# Patient Record
Sex: MALE | Race: WHITE | NOT HISPANIC OR LATINO | URBAN - METROPOLITAN AREA
[De-identification: names, ages, dates, MRNs, and addresses within clinical notes are randomized per-mention and may not be internally consistent; named-entity substitution may affect disease eponyms.]

---

## 2017-02-04 ENCOUNTER — COMMUNICATION - HEALTHEAST (OUTPATIENT)
Dept: FAMILY MEDICINE | Facility: CLINIC | Age: 72
End: 2017-02-04

## 2017-02-04 DIAGNOSIS — E78.5 HYPERLIPIDEMIA: ICD-10-CM

## 2017-02-13 ENCOUNTER — COMMUNICATION - HEALTHEAST (OUTPATIENT)
Dept: FAMILY MEDICINE | Facility: CLINIC | Age: 72
End: 2017-02-13

## 2017-02-13 DIAGNOSIS — I10 HYPERTENSION: ICD-10-CM

## 2017-02-15 RX ORDER — LOSARTAN POTASSIUM 50 MG/1
50 TABLET ORAL DAILY
Qty: 90 TABLET | Refills: 0 | Status: SHIPPED | OUTPATIENT
Start: 2017-02-15

## 2017-02-20 ENCOUNTER — AMBULATORY - HEALTHEAST (OUTPATIENT)
Dept: LAB | Facility: CLINIC | Age: 72
End: 2017-02-20

## 2017-02-20 DIAGNOSIS — E78.5 HYPERLIPIDEMIA: ICD-10-CM

## 2017-02-20 DIAGNOSIS — M19.90 INFLAMMATORY ARTHRITIS: ICD-10-CM

## 2017-02-20 LAB
ALT SERPL W P-5'-P-CCNC: 39 U/L (ref 0–45)
AST SERPL W P-5'-P-CCNC: 22 U/L (ref 0–40)
CHOLEST SERPL-MCNC: 154 MG/DL
CREAT SERPL-MCNC: 0.73 MG/DL (ref 0.7–1.3)
FASTING STATUS PATIENT QL REPORTED: YES
GFR SERPL CREATININE-BSD FRML MDRD: >60 ML/MIN/1.73M2
HDLC SERPL-MCNC: 41 MG/DL
LDLC SERPL CALC-MCNC: 93 MG/DL
TRIGL SERPL-MCNC: 100 MG/DL

## 2017-02-22 ENCOUNTER — OFFICE VISIT - HEALTHEAST (OUTPATIENT)
Dept: RHEUMATOLOGY | Facility: CLINIC | Age: 72
End: 2017-02-22

## 2017-02-22 ENCOUNTER — COMMUNICATION - HEALTHEAST (OUTPATIENT)
Dept: RHEUMATOLOGY | Facility: CLINIC | Age: 72
End: 2017-02-22

## 2017-02-22 DIAGNOSIS — M19.90 INFLAMMATORY ARTHRITIS: ICD-10-CM

## 2017-02-22 DIAGNOSIS — Z79.899 HIGH RISK MEDICATION USE: ICD-10-CM

## 2017-02-22 DIAGNOSIS — M19.041 PRIMARY OSTEOARTHRITIS OF BOTH HANDS: ICD-10-CM

## 2017-02-22 DIAGNOSIS — M19.042 PRIMARY OSTEOARTHRITIS OF BOTH HANDS: ICD-10-CM

## 2017-02-22 ASSESSMENT — MIFFLIN-ST. JEOR: SCORE: 1671.91

## 2017-04-21 ENCOUNTER — AMBULATORY - HEALTHEAST (OUTPATIENT)
Dept: LAB | Facility: CLINIC | Age: 72
End: 2017-04-21

## 2017-04-21 DIAGNOSIS — M19.90 INFLAMMATORY ARTHRITIS: ICD-10-CM

## 2017-04-21 LAB
ALT SERPL W P-5'-P-CCNC: 26 U/L (ref 0–45)
CREAT SERPL-MCNC: 0.82 MG/DL (ref 0.7–1.3)
GFR SERPL CREATININE-BSD FRML MDRD: >60 ML/MIN/1.73M2

## 2017-06-14 ENCOUNTER — AMBULATORY - HEALTHEAST (OUTPATIENT)
Dept: LAB | Facility: CLINIC | Age: 72
End: 2017-06-14

## 2017-06-14 DIAGNOSIS — M19.90 INFLAMMATORY ARTHRITIS: ICD-10-CM

## 2017-06-14 LAB
ALT SERPL W P-5'-P-CCNC: 38 U/L (ref 0–45)
CREAT SERPL-MCNC: 0.77 MG/DL (ref 0.7–1.3)
GFR SERPL CREATININE-BSD FRML MDRD: >60 ML/MIN/1.73M2

## 2017-07-03 ENCOUNTER — OFFICE VISIT - HEALTHEAST (OUTPATIENT)
Dept: RHEUMATOLOGY | Facility: CLINIC | Age: 72
End: 2017-07-03

## 2017-07-03 DIAGNOSIS — I10 HYPERTENSION: ICD-10-CM

## 2017-07-03 DIAGNOSIS — M19.042 PRIMARY OSTEOARTHRITIS OF BOTH HANDS: ICD-10-CM

## 2017-07-03 DIAGNOSIS — M19.041 PRIMARY OSTEOARTHRITIS OF BOTH HANDS: ICD-10-CM

## 2017-07-03 DIAGNOSIS — M19.90 INFLAMMATORY ARTHRITIS: ICD-10-CM

## 2017-07-03 DIAGNOSIS — Z79.899 HIGH RISK MEDICATION USE: ICD-10-CM

## 2017-07-10 ENCOUNTER — COMMUNICATION - HEALTHEAST (OUTPATIENT)
Dept: RHEUMATOLOGY | Facility: CLINIC | Age: 72
End: 2017-07-10

## 2017-07-10 DIAGNOSIS — M19.90 INFLAMMATORY ARTHRITIS: ICD-10-CM

## 2017-09-13 ENCOUNTER — COMMUNICATION - HEALTHEAST (OUTPATIENT)
Dept: LAB | Facility: CLINIC | Age: 72
End: 2017-09-13

## 2017-09-13 DIAGNOSIS — I10 HYPERTENSION: ICD-10-CM

## 2017-09-13 DIAGNOSIS — M19.90 OSTEOARTHRITIS: ICD-10-CM

## 2017-09-13 DIAGNOSIS — M19.90 INFLAMMATORY ARTHRITIS: ICD-10-CM

## 2021-05-30 VITALS — HEIGHT: 69 IN | BODY MASS INDEX: 30.78 KG/M2 | WEIGHT: 207.8 LBS

## 2021-05-31 VITALS — WEIGHT: 211.2 LBS | BODY MASS INDEX: 30.96 KG/M2

## 2021-06-09 NOTE — PROGRESS NOTES
ASSESSMENT AND PLAN:  Joaquin Valenzuela 71 y.o. male  is here for follow-up of inflammatory polyarthritis, osteoarthritis.  He does not have evidence of autoimmunity.  He has done great with the current combination of methotrexate along with folic acid.  His recent labs are reviewed within normal range.  He is very happy with the progress.  He does have osteoarthritis the management of which  he is aware is different.   Asked him to continue methotrexate as now, return for follow-up here in 4 months with labs every 2.    Management principles of osteoarthritis were reviewed.   Diagnoses and all orders for this visit:    Inflammatory arthritis  -     methotrexate 2.5 MG tablet; Take 8 tablets (20 mg total) by mouth once a week.  Dispense: 96 tablet; Refill: 0  -     folic acid (FOLVITE) 1 MG tablet; Take 1 tablet (1 mg total) by mouth daily.  Dispense: 30 tablet; Refill: 11    High risk medication use  -     folic acid (FOLVITE) 1 MG tablet; Take 1 tablet (1 mg total) by mouth daily.  Dispense: 30 tablet; Refill: 11    Primary osteoarthritis of both hands      HISTORY OF PRESENTING ILLNESS:  Joaquin Valenzuela, 71 y.o., male is here for for inflammatory polyarthritis predominantly affecting the hands wrists, osteoarthritis.  He reports that it all began about a year or so ago when he started hurting in his shoulder especially the right side.  Soon thereafter his other joints in the upper extremities more so on the right than the left are affected including wrists and the finger joints.  He recalls having had an injection in the shoulder which worked very well for him but lasted about a month.  Now he is been on methotrexate and finds it quite helpful.  He has noted mild discomfort in his right wrist and right shoulder.  Morning stiffness no more than 5-10 minutes.  He is due for his labs to methotrexate but otherwise appears to have tolerated it well.  He has a brother with rheumatoid arthritis.  There is no personal or  family history of psoriasis ulcerative colitis or Crohn's disease.  He has not smoked for the past 20 years.  Alcohol none.  He has history of hypertension.  He describes himself otherwise in good health.   Further historical information and ADL limitations as noted in the multidimensional health assessment questionnaire attached in the EMR.      ALLERGIES:Lisinopril    PAST MEDICAL/ACTIVE PROBLEMS/MEDICATION/ FAMILY HISTORY/SOCIAL DATA:  The patient has a family history of  No past medical history on file.  History   Smoking Status     Former Smoker     Types: Cigarettes   Smokeless Tobacco     Never Used     Comment: quit 20+ years ago     Patient Active Problem List   Diagnosis     Male Erectile Disorder     Obstructive Sleep Apnea     Inflammatory arthritis     High risk medication use     Hypertension     Primary osteoarthritis of both hands     Current Outpatient Prescriptions   Medication Sig Dispense Refill     aspirin 81 mg chewable tablet Chew 81 mg daily.       atorvastatin (LIPITOR) 20 MG tablet TAKE 1 TABLET(20 MG) BY MOUTH DAILY 90 tablet 2     atorvastatin (LIPITOR) 20 MG tablet TAKE 1 TABLET(20 MG) BY MOUTH DAILY 90 tablet 0     losartan (COZAAR) 50 MG tablet Take 1 tablet (50 mg total) by mouth daily. 90 tablet 0     methotrexate 2.5 MG tablet Take 8 tablets (20 mg total) by mouth once a week. 96 tablet 0     multivitamin therapeutic (THERAGRAN) tablet Take 1 tablet by mouth daily.       NEVANAC 0.1 % ophthalmic suspension   1     omega 3-dha-epa-fish oil 1,200 (144-216) mg cap Take by mouth daily.       potassium gluconate 500 mg (83 mg) tablet Take by mouth daily.       No current facility-administered medications for this visit.      DETAILED EXAMINATION (six area) :  Vitals:    02/22/17 0934 02/22/17 0936   BP: 152/76 146/74   Patient Site: Left Arm Left Arm   Patient Position: Sitting Sitting   Cuff Size: Adult Regular Adult Regular   Pulse: 76    Weight: 207 lb 12.8 oz (94.3 kg)    Height: 5'  "9.25\" (1.759 m)      Alert oriented. Head including the face is examined for malar rash, heliotropes, scarring, lupus pernio. Eyes examined for redness such as in episcleritis/scleritis, periorbital lesions.   Neck examined  for lymph nodes, range of motion Both upper and lower extremities (all four) examined for swollen, warm &/or  tender joints, range of motion, rash, muscle weakness, edema. The salient normal / abnormal findings are appended.    No appreciable synovitis in any of the palpable appendicular joints. he has no longer the synovial thickening at his right second MCP.  he has several tattoos.  He does not have impingement in his shoulder especially the right side.     LAB / IMAGING DATA:  ALT   Date Value Ref Range Status   02/20/2017 39 0 - 45 U/L Final   12/12/2016 28 0 - 45 U/L Final   10/06/2016 23 0 - 45 U/L Final     ALBUMIN   Date Value Ref Range Status   02/20/2017 4.1 3.5 - 5.0 g/dL Final   12/12/2016 4.3 3.5 - 5.0 g/dL Final   10/06/2016 4.2 3.5 - 5.0 g/dL Final     CREATININE   Date Value Ref Range Status   02/20/2017 0.73 0.70 - 1.30 mg/dL Final   12/12/2016 0.76 0.70 - 1.30 mg/dL Final   10/06/2016 0.73 0.70 - 1.30 mg/dL Final       WBC   Date Value Ref Range Status   02/20/2017 5.2 4.0 - 11.0 thou/uL Final   12/12/2016 6.2 4.0 - 11.0 thou/uL Final     HEMOGLOBIN   Date Value Ref Range Status   02/20/2017 15.1 14.0 - 18.0 g/dL Final   12/12/2016 15.4 14.0 - 18.0 g/dL Final   10/06/2016 14.8 14.0 - 18.0 g/dL Final     PLATELETS   Date Value Ref Range Status   02/20/2017 256 140 - 440 thou/uL Final   12/12/2016 268 140 - 440 thou/uL Final   10/06/2016 251 140 - 440 thou/uL Final       Lab Results   Component Value Date    RF <15.0 08/27/2015    SEDRATE 8 02/04/2016          "

## 2021-06-11 NOTE — PROGRESS NOTES
ASSESSMENT AND PLAN:  Joaquin Valenzuela 72 y.o. male has undifferentiated inflammatory polyarthritis, osteoarthritis, without evidence of autoimmunity.  He is doing just great with methotrexate and folic acid which he has tolerated nicely.  He is doing well as resident labs are concerned to he did those few days ago.  He has noted good tolerability with the current regimen.  Continue as now.  Management principles of osteoarthritis reviewed.  For the reasons of comorbidities, methotrexate he is aware that should he have a flareup of osteoarthritis symptoms nonsteroidals would not be his primary choice.  Acetaminophen will be preferred choice.  As well as he is doing we will meet here now in 6 months with labs every 3 months.          Management principles of osteoarthritis were reviewed.   Diagnoses and all orders for this visit:    Inflammatory arthritis  -     methotrexate 2.5 MG tablet; Take 8 tablets (20 mg total) by mouth once a week.  Dispense: 96 tablet; Refill: 0    High risk medication use    Primary osteoarthritis of both hands    Hypertension      HISTORY OF PRESENTING ILLNESS:  Joaquin Valenzuela, 72 y.o., male is here for seronegative inflammatory polyarthritis predominantly affecting the hands wrists, osteoarthritis.  With the current regimen he is doing great.  He noted pain level of 1.5 and a mild discomfort in the right wrist.  He is able do all his day-to-day activities.  This is done without any limitations.  His morning stiffness noted to be 5 minutes.  There is no fever weight loss blurry vision eye redness mouth ulcer nausea cough or rash.  He has tolerated methotrexate nicely takes folic acid with that he had his labs drawn in June which were within normal range. He has a brother with rheumatoid arthritis.  There is no personal or family history of psoriasis ulcerative colitis or Crohn's disease.  He has not smoked for the past 20 years.  Alcohol none.  He has history of hypertension.  He describes  himself otherwise in good health.   Further historical information and ADL limitations as noted in the multidimensional health assessment questionnaire attached in the EMR.      ALLERGIES:Lisinopril    PAST MEDICAL/ACTIVE PROBLEMS/MEDICATION/ FAMILY HISTORY/SOCIAL DATA:  The patient has a family history of  No past medical history on file.  History   Smoking Status     Former Smoker     Types: Cigarettes   Smokeless Tobacco     Never Used     Comment: quit 20+ years ago     Patient Active Problem List   Diagnosis     Male Erectile Disorder     Obstructive Sleep Apnea     Inflammatory arthritis     High risk medication use     Hypertension     Primary osteoarthritis of both hands     Current Outpatient Prescriptions   Medication Sig Dispense Refill     aspirin 81 mg chewable tablet Chew 81 mg daily.       atorvastatin (LIPITOR) 20 MG tablet TAKE 1 TABLET(20 MG) BY MOUTH DAILY 90 tablet 2     folic acid (FOLVITE) 1 MG tablet Take 1 tablet (1 mg total) by mouth daily. 30 tablet 11     losartan (COZAAR) 50 MG tablet Take 1 tablet (50 mg total) by mouth daily. 90 tablet 0     methotrexate 2.5 MG tablet Take 8 tablets (20 mg total) by mouth once a week. 96 tablet 0     multivitamin therapeutic (THERAGRAN) tablet Take 1 tablet by mouth daily.       NEVANAC 0.1 % ophthalmic suspension   1     omega 3-dha-epa-fish oil 1,200 (144-216) mg cap Take by mouth daily.       potassium gluconate 500 mg (83 mg) tablet Take by mouth daily.       No current facility-administered medications for this visit.      DETAILED EXAMINATION (six area) :  Vitals:    07/03/17 0843   BP: 124/70   Resp: 14   Weight: 211 lb 3.2 oz (95.8 kg)     Alert oriented. Head including the face is examined for malar rash, heliotropes, scarring, lupus pernio. Eyes examined for redness such as in episcleritis/scleritis, periorbital lesions.   Neck examined  for lymph nodes, range of motion Both upper and lower extremities (all four) examined for swollen, warm  &/or  tender joints, range of motion, rash, muscle weakness, edema. The salient normal / abnormal findings are appended.    No appreciable synovitis in any of the palpable appendicular joints.   He has several tattoos.   LAB / IMAGING DATA:  ALT   Date Value Ref Range Status   06/14/2017 38 0 - 45 U/L Final   04/21/2017 26 0 - 45 U/L Final   02/20/2017 39 0 - 45 U/L Final     Albumin   Date Value Ref Range Status   06/14/2017 4.1 3.5 - 5.0 g/dL Final   04/21/2017 4.2 3.5 - 5.0 g/dL Final   02/20/2017 4.1 3.5 - 5.0 g/dL Final     Creatinine   Date Value Ref Range Status   06/14/2017 0.77 0.70 - 1.30 mg/dL Final   04/21/2017 0.82 0.70 - 1.30 mg/dL Final   02/20/2017 0.73 0.70 - 1.30 mg/dL Final       WBC   Date Value Ref Range Status   06/14/2017 5.9 4.0 - 11.0 thou/uL Final   04/21/2017 4.4 4.0 - 11.0 thou/uL Final     Hemoglobin   Date Value Ref Range Status   06/14/2017 15.4 14.0 - 18.0 g/dL Final   04/21/2017 15.2 14.0 - 18.0 g/dL Final   02/20/2017 15.1 14.0 - 18.0 g/dL Final     Platelets   Date Value Ref Range Status   06/14/2017 261 140 - 440 thou/uL Final   04/21/2017 237 140 - 440 thou/uL Final   02/20/2017 256 140 - 440 thou/uL Final       Lab Results   Component Value Date    RF <15.0 08/27/2015    SEDRATE 8 02/04/2016